# Patient Record
Sex: MALE | Race: WHITE | Employment: UNEMPLOYED | URBAN - METROPOLITAN AREA
[De-identification: names, ages, dates, MRNs, and addresses within clinical notes are randomized per-mention and may not be internally consistent; named-entity substitution may affect disease eponyms.]

---
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
## 2024-01-11 ENCOUNTER — PATIENT OUTREACH (OUTPATIENT)
Dept: CARE COORDINATION | Facility: CLINIC | Age: 19
End: 2024-01-11

## 2024-01-11 NOTE — PROGRESS NOTES
Clinic Care Coordination Contact  Follow Up Progress Note      Assessment:  The pt was recently in the ED, I called to check up on the pt, and help the pt setup a ED follow up. The pt was at [place] for a shoulder injury. I called the pt,but his phone was not reachable right now.     Care Gaps:    Health Maintenance Due   Topic Date Due    ADVANCE CARE PLANNING  Never done    DEPRESSION ACTION PLAN  Never done    COVID-19 Vaccine (1) Never done    HPV IMMUNIZATION (2 - Male 2-dose series) 02/21/2016    YEARLY PREVENTIVE VISIT  08/21/2016    HIV SCREENING  Never done    PHQ-9  04/16/2021    HEPATITIS C SCREENING  Never done    INFLUENZA VACCINE (1) Never done           Care Plans      Intervention/Education provided during outreach:               Plan:     Care Coordinator will follow up in

## 2024-01-12 ENCOUNTER — PATIENT OUTREACH (OUTPATIENT)
Dept: CARE COORDINATION | Facility: CLINIC | Age: 19
End: 2024-01-12

## 2024-01-12 NOTE — PROGRESS NOTES
Clinic Care Coordination Contact  Follow Up Progress Note      Assessment:  The pt was recently in the ED, I called to check up on the pt, and help the pt setup a ED follow up. The pt was at [place] for a shoulder injury. I called the pt,but his phone was not reachable right now.      Care Gaps:    Health Maintenance Due   Topic Date Due    ADVANCE CARE PLANNING  Never done    DEPRESSION ACTION PLAN  Never done    COVID-19 Vaccine (1) Never done    HPV IMMUNIZATION (2 - Male 2-dose series) 02/21/2016    YEARLY PREVENTIVE VISIT  08/21/2016    HIV SCREENING  Never done    PHQ-9  04/16/2021    HEPATITIS C SCREENING  Never done    INFLUENZA VACCINE (1) Never done           Care Plans      Intervention/Education provided during outreach:               Plan:     Care Coordinator will follow up in

## 2024-01-16 ENCOUNTER — PATIENT OUTREACH (OUTPATIENT)
Dept: CARE COORDINATION | Facility: CLINIC | Age: 19
End: 2024-01-16

## 2024-01-16 NOTE — PROGRESS NOTES
Clinic Care Coordination Contact  Follow Up Progress Note      Assessment:  The pt was recently in the ED, I called to check up on the pt, and help the pt setup a ED follow up. The pt was at [place] for a shoulder injury. I called the pt,but his phone was not reachable right now.       Care Gaps:    Health Maintenance Due   Topic Date Due    ADVANCE CARE PLANNING  Never done    DEPRESSION ACTION PLAN  Never done    COVID-19 Vaccine (1) Never done    HPV IMMUNIZATION (2 - Male 2-dose series) 02/21/2016    YEARLY PREVENTIVE VISIT  08/21/2016    HIV SCREENING  Never done    PHQ-9  04/16/2021    HEPATITIS C SCREENING  Never done    INFLUENZA VACCINE (1) Never done           Care Plans      Intervention/Education provided during outreach:               Plan:     Care Coordinator will follow up in